# Patient Record
Sex: MALE | Race: BLACK OR AFRICAN AMERICAN
[De-identification: names, ages, dates, MRNs, and addresses within clinical notes are randomized per-mention and may not be internally consistent; named-entity substitution may affect disease eponyms.]

---

## 2017-01-04 ENCOUNTER — HOSPITAL ENCOUNTER (EMERGENCY)
Dept: HOSPITAL 25 - ED | Age: 37
Discharge: HOME | End: 2017-01-04
Payer: MEDICAID

## 2017-01-04 VITALS — DIASTOLIC BLOOD PRESSURE: 84 MMHG | SYSTOLIC BLOOD PRESSURE: 134 MMHG

## 2017-01-04 DIAGNOSIS — L03.116: Primary | ICD-10-CM
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
## 2017-01-04 DIAGNOSIS — S81.802D: ICD-10-CM

## 2017-01-04 DIAGNOSIS — I82.442: ICD-10-CM

## 2017-01-04 LAB
ALBUMIN SERPL BCG-MCNC: 4.1 G/DL (ref 3.2–5.2)
ALP SERPL-CCNC: 52 U/L (ref 34–104)
ALT SERPL W P-5'-P-CCNC: 29 U/L (ref 7–52)
ANION GAP SERPL CALC-SCNC: 6 MMOL/L (ref 2–11)
AST SERPL-CCNC: 26 U/L (ref 13–39)
BUN SERPL-MCNC: 10 MG/DL (ref 6–24)
BUN/CREAT SERPL: 10.8 (ref 8–20)
CALCIUM SERPL-MCNC: 9.5 MG/DL (ref 8.6–10.3)
CHLORIDE SERPL-SCNC: 104 MMOL/L (ref 101–111)
GLOBULIN SER CALC-MCNC: 2.9 G/DL (ref 2–4)
GLUCOSE SERPL-MCNC: 106 MG/DL (ref 70–100)
HCO3 SERPL-SCNC: 28 MMOL/L (ref 22–32)
HCT VFR BLD AUTO: 41 % (ref 42–52)
HGB BLD-MCNC: 13.8 G/DL (ref 14–18)
MCH RBC QN AUTO: 31 PG (ref 27–31)
MCHC RBC AUTO-ENTMCNC: 34 G/DL (ref 31–36)
MCV RBC AUTO: 93 FL (ref 80–94)
POTASSIUM SERPL-SCNC: 4 MMOL/L (ref 3.5–5)
PROT SERPL-MCNC: 7 G/DL (ref 6.4–8.9)
RBC # BLD AUTO: 4.38 10^6/UL (ref 4–5.4)
SODIUM SERPL-SCNC: 138 MMOL/L (ref 133–145)
WBC # BLD AUTO: 6.8 10^3/UL (ref 3.5–10.8)

## 2017-01-04 PROCEDURE — 99282 EMERGENCY DEPT VISIT SF MDM: CPT

## 2017-01-04 PROCEDURE — 80053 COMPREHEN METABOLIC PANEL: CPT

## 2017-01-04 PROCEDURE — 83605 ASSAY OF LACTIC ACID: CPT

## 2017-01-04 PROCEDURE — 86141 C-REACTIVE PROTEIN HS: CPT

## 2017-01-04 PROCEDURE — 85025 COMPLETE CBC W/AUTO DIFF WBC: CPT

## 2017-01-04 PROCEDURE — 36415 COLL VENOUS BLD VENIPUNCTURE: CPT
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
## 2017-01-05 ENCOUNTER — HOSPITAL ENCOUNTER (EMERGENCY)
Dept: HOSPITAL 25 - ED | Age: 37
Discharge: HOME | End: 2017-01-05
Payer: MEDICAID

## 2017-01-05 VITALS — SYSTOLIC BLOOD PRESSURE: 127 MMHG | DIASTOLIC BLOOD PRESSURE: 67 MMHG

## 2017-01-05 DIAGNOSIS — I82.402: ICD-10-CM

## 2017-01-05 DIAGNOSIS — L03.116: Primary | ICD-10-CM

## 2017-01-05 PROCEDURE — 99282 EMERGENCY DEPT VISIT SF MDM: CPT
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
## 2017-01-28 ENCOUNTER — HOSPITAL ENCOUNTER (EMERGENCY)
Dept: HOSPITAL 25 - ED | Age: 37
Discharge: HOME | End: 2017-01-28
Payer: MEDICAID

## 2017-01-28 VITALS — DIASTOLIC BLOOD PRESSURE: 65 MMHG | SYSTOLIC BLOOD PRESSURE: 112 MMHG

## 2017-01-28 DIAGNOSIS — Z76.0: Primary | ICD-10-CM

## 2017-01-28 PROCEDURE — 99281 EMR DPT VST MAYX REQ PHY/QHP: CPT

## 2017-01-28 NOTE — UC
UC General HPI





- History of Current Complaint


Chief Complaint: EDPrescriptionNeeded


Stated Complaint: RX REFILL


Time Seen by Provider: 01/28/17 11:16


Hx Obtained From: Patient - did not refill Eliquis in time and needs 5mg bid 

until Monday. pt has no complaints, no leg pain or CP/SOB


Pain Intensity: 0





- Allergy/Home Medications


Allergies/Adverse Reactions: 


 Allergies











Allergy/AdvReac Type Severity Reaction Status Date / Time


 


Shellfish Allergy Allergy Mild Anaphylatic Verified 01/28/17 11:03





   Shock  


 


Aspirin [ASA] Allergy  Anaphylatic Verified 01/28/17 11:03





   Shock  














PMH/Surg Hx/FS Hx/Imm Hx


Previously Healthy: Yes


Respiratory History Of: 


   Denies: Asthma


Other History Of: Anticoagulant Therapy - DVT





- Family History


Known Family History: Positive: None


   Negative: Blood Disorder





- Social History


Occupation: Unemployed


Lives: With Family


Alcohol Use: None


Substance Use Type: None


Smoking Status (MU): Never Smoked Tobacco





Review of Systems


Constitutional: Negative


Respiratory: Negative


Cardiovascular: Negative


Neurovascular: Negative


Musculoskeletal: Negative


Neurological: Negative


Psychological: Negative


All Other Systems Reviewed And Are Negative: Yes





Physical Exam


Triage Information Reviewed: Yes


Appearance: Well-Appearing, No Pain Distress, Well-Nourished


Vital Signs: 


 Initial Vital Signs











Temp  98.0 F   01/28/17 11:03


 


Pulse  72   01/28/17 11:03


 


Resp  16   01/28/17 11:03


 


BP  112/65   01/28/17 11:03


 


Pulse Ox  99   01/28/17 11:03











Vital Signs Reviewed: Yes


Respiratory Exam: Normal


Cardiovascular Exam: Normal


Musculoskeletal Exam: Normal - no calf pain or swelling


Musculoskeletal: Positive: ROM Intact


Neurological Exam: Normal


Psychological Exam: Normal


Skin Exam: Normal





Course/Dx





- Differential Dx - Multi-Symptom


Differential Diagnoses: Other - medication refill


Provider Diagnoses: Medication refill





Discharge





- Discharge Plan


Condition: Good


Disposition: HOME


Patient Education Materials:  Apixaban (By mouth)


Additional Instructions: 


follow-up with your provider on Monday early am to refill your prescription

## 2018-02-19 ENCOUNTER — HOSPITAL ENCOUNTER (EMERGENCY)
Dept: HOSPITAL 25 - ED | Age: 38
LOS: 1 days | Discharge: HOME | End: 2018-02-20
Payer: SELF-PAY

## 2018-02-19 DIAGNOSIS — F17.210: ICD-10-CM

## 2018-02-19 DIAGNOSIS — M25.562: Primary | ICD-10-CM

## 2018-02-19 DIAGNOSIS — Z04.1: ICD-10-CM

## 2018-02-19 PROCEDURE — 99282 EMERGENCY DEPT VISIT SF MDM: CPT
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
## 2018-02-20 VITALS — SYSTOLIC BLOOD PRESSURE: 114 MMHG | DIASTOLIC BLOOD PRESSURE: 59 MMHG

## 2018-06-02 ENCOUNTER — HOSPITAL ENCOUNTER (EMERGENCY)
Dept: HOSPITAL 25 - UCEAST | Age: 38
Discharge: HOME | End: 2018-06-02
Payer: COMMERCIAL

## 2018-06-02 DIAGNOSIS — F17.210: ICD-10-CM

## 2018-06-02 DIAGNOSIS — Z88.6: ICD-10-CM

## 2018-06-02 DIAGNOSIS — J02.0: Primary | ICD-10-CM

## 2018-06-02 PROCEDURE — 99202 OFFICE O/P NEW SF 15 MIN: CPT

## 2018-06-02 PROCEDURE — 87651 STREP A DNA AMP PROBE: CPT

## 2018-06-02 PROCEDURE — G0463 HOSPITAL OUTPT CLINIC VISIT: HCPCS

## 2018-06-02 NOTE — UC
Throat Pain/Nasal Macho HPI





- HPI Summary


HPI Summary: 





Patient is a 37-year-old male presenting to the  with chief complaint of 

throat pain 2 days.  Denies any fevers, sweats, chills.  Denies any sick 

contacts.  History of strep throat many years ago, and none recently.  He took 

3 tabs of azithromycin of the past day and a half without relief.  Denies 

headache, abdominal pain or other symptoms at this time.  Symptoms are 

aggravated by nothing and alleviated with nothing.  Endorses odynophagia and 

dysphagia.  Denies any ear pain or head pain.





- History of Current Complaint


Chief Complaint: UCRespiratory


Stated Complaint: THROAT PAIN


Time Seen by Provider: 06/02/18 10:29


Hx Obtained From: Patient


Onset/Duration: Sudden Onset


Severity: Moderate


Pain Intensity: 9


Pain Scale Used: 0-10 Numeric





- Epiglottits Risk Factors


Epiglottis Risk Factors: Negative





- Allergies/Home Medications


Allergies/Adverse Reactions: 


 Allergies











Allergy/AdvReac Type Severity Reaction Status Date / Time


 


aspirin Allergy  Swelling Verified 06/02/18 10:37











Home Medications: 


 Home Medications





Azithromycin TAB* [Zithromax TAB (Z-DELMY) 250 mg #6 tabs] 250 mg PO DAILY 06/02/ 18 [History Confirmed 06/02/18]











PMH/Surg Hx/FS Hx/Imm Hx


Previously Healthy: Yes





- Surgical History


Surgical History: Yes


Surgery Procedure, Year, and Place: anthroscopic to lt shoulder





- Family History


Known Family History: Positive: Unknown





- Social History


Occupation: Employed Full-time


Lives: With Family


Alcohol Use: Occasionally


Substance Use Type: None


Smoking Status (MU): Heavy Every Day Tobacco Smoker


Type: Cigarettes





Review of Systems


Constitutional: Negative


Skin: Negative


Eyes: Negative


ENT: Sore Throat


Respiratory: Negative


Cardiovascular: Negative


Motor: Negative


Neurological: Negative


Psychological: Negative


Is Patient Immunocompromised?: No


All Other Systems Reviewed And Are Negative: Yes





Physical Exam


Triage Information Reviewed: Yes


Appearance: Well-Appearing, No Pain Distress, Well-Nourished


Vital Signs: 


 Initial Vital Signs











Temp  97.4 F   06/02/18 10:32


 


Pulse  80   06/02/18 10:32


 


Resp  18   06/02/18 10:32


 


BP  120/77   06/02/18 10:32


 


Pulse Ox  100   06/02/18 10:32











Eye Exam: Normal


Eyes: Positive: Conjunctiva Clear


ENT: Positive: Pharyngeal erythema, Tonsillar swelling, Tonsillar exudate, 

Uvula midline.  Negative: Pharynx normal, TM bulging, TM dull, TM red, Trismus, 

Muffled voice, Hoarse voice, Dental tenderness, Sinus tenderness


Neck: Positive: Supple


Respiratory Exam: Normal


Respiratory: Positive: Chest non-tender, Lungs clear


Cardiovascular Exam: Normal


Cardiovascular: Positive: RRR


Musculoskeletal Exam: Normal


Neurological: Positive: Alert, Muscle Tone Normal


Psychological: Positive: Age Appropriate Behavior


Skin Exam: Normal





Throat Pain/Nasal Course/Dx





- Course


Course Of Treatment: The patient's evaluated for throat pain.  Strep swab 

obtained and is positive.  Physical examination is otherwise benign.  Lungs 

CTA.  RRR.  There is pharyngeal erythema with bilateral tonsillar exudates.  No 

trismus, no muffled voice.  Uvula is midline.  There is no signs of 

peritonsillar abscesses bilaterally.  He is given amoxicillin 500 mg twice a 

day 10 days.  Vital signs are stable including blood pressure.





- Differential Dx/Diagnosis


Provider Diagnoses: Strep throat





Discharge





- Sign-Out/Discharge


Documenting (check all that apply): Discharge/Admit/Transfer





- Discharge Plan


Condition: Stable


Disposition: HOME


Prescriptions: 


Amoxicillin PO (*) [Amoxicillin 500 MG CAP*] 500 mg PO Q12H #20 cap


Patient Education Materials:  Strep Throat (ED)


Referrals: 


No Primary Care Phys,NOPCP [Primary Care Provider] - 


Additional Instructions: 


complete the entire course of abx even if you begin to feel better!


amoxicillin twice daily x 10 days


wash hands frequently





- Billing Disposition and Condition


Condition: STABLE


Disposition: HOME

## 2018-10-02 ENCOUNTER — HOSPITAL ENCOUNTER (EMERGENCY)
Dept: HOSPITAL 25 - ED | Age: 38
LOS: 1 days | Discharge: HOME | End: 2018-10-03
Payer: MEDICAID

## 2018-10-02 DIAGNOSIS — R07.9: Primary | ICD-10-CM

## 2018-10-02 DIAGNOSIS — Z79.01: ICD-10-CM

## 2018-10-02 DIAGNOSIS — F17.210: ICD-10-CM

## 2018-10-02 PROCEDURE — 99283 EMERGENCY DEPT VISIT LOW MDM: CPT

## 2018-10-02 PROCEDURE — 80053 COMPREHEN METABOLIC PANEL: CPT

## 2018-10-02 PROCEDURE — 81003 URINALYSIS AUTO W/O SCOPE: CPT

## 2018-10-02 PROCEDURE — 83690 ASSAY OF LIPASE: CPT

## 2018-10-02 PROCEDURE — 85025 COMPLETE CBC W/AUTO DIFF WBC: CPT

## 2018-10-02 PROCEDURE — 84443 ASSAY THYROID STIM HORMONE: CPT

## 2018-10-02 PROCEDURE — 84484 ASSAY OF TROPONIN QUANT: CPT

## 2018-10-02 PROCEDURE — 86140 C-REACTIVE PROTEIN: CPT

## 2018-10-02 PROCEDURE — 71045 X-RAY EXAM CHEST 1 VIEW: CPT

## 2018-10-02 PROCEDURE — 93005 ELECTROCARDIOGRAM TRACING: CPT

## 2018-10-02 PROCEDURE — 36415 COLL VENOUS BLD VENIPUNCTURE: CPT

## 2018-10-03 VITALS — SYSTOLIC BLOOD PRESSURE: 109 MMHG | DIASTOLIC BLOOD PRESSURE: 62 MMHG

## 2018-10-03 LAB
BASOPHILS # BLD AUTO: 0 10^3/UL (ref 0–0.2)
EOSINOPHIL # BLD AUTO: 0.1 10^3/UL (ref 0–0.6)
HCT VFR BLD AUTO: 41 % (ref 42–52)
HGB BLD-MCNC: 13.9 G/DL (ref 14–18)
LYMPHOCYTES # BLD AUTO: 2.5 10^3/UL (ref 1–4.8)
MCH RBC QN AUTO: 32 PG (ref 27–31)
MCHC RBC AUTO-ENTMCNC: 34 G/DL (ref 31–36)
MCV RBC AUTO: 94 FL (ref 80–94)
MONOCYTES # BLD AUTO: 0.5 10^3/UL (ref 0–0.8)
NEUTROPHILS # BLD AUTO: 4.5 10^3/UL (ref 1.5–7.7)
NRBC # BLD AUTO: 0 10^3/UL
NRBC BLD QL AUTO: 0.3
PLATELET # BLD AUTO: 264 10^3/UL (ref 150–450)
RBC # BLD AUTO: 4.32 10^6/UL (ref 4–5.4)
WBC # BLD AUTO: 7.6 10^3/UL (ref 3.5–10.8)

## 2018-10-03 NOTE — ED
Course/Dx





- Course


Course Of Treatment: Patient complains of sudden onset left chest pain 1 hour.

  Chest pain described as new onset, occurring at rest, sharp, throbbing, 

lasting around 40 minutes, no radiation.  States chest pain resolved around 

time of triage.  Denies prior cardiac history.  Denies fever, cough, sore 

throat somewhat, SOB, N/V/D, abdominal pain, change in urine, change in BM.  

Medical history is none.  Denies family cardiac history.  Positive smoker.  

Denies excess caffeine, monster drinks, methamphetamine, cocaine.  Admits to 

occasional EtOH.  Physical exam:Chest pain not reproducible with palpation or 

movement of arms.  Vital signs within normal limits.  EKG unremarkable.  Chest x

-ray negative.  Lab work and initial troponin negative.  Smoking is only risk 

factor.  We'll sign patient out to Dr Tyson to wait for second troponin





- Diagnoses


Provider Diagnoses: 


 Chest pain








Discharge





- Sign-Out/Discharge


Documenting (check all that apply): Patient Departure - discharge, Receiving 

Sign-Out


Receiving patient FROM: Ryan Dahl





- Discharge Plan


Condition: Stable


Disposition: HOME


Patient Education Materials:  Chest Pain (ED)


Referrals: 


Angel Mcgrath MD [Primary Care Provider] - 


Donnie Mittal MD [Medical Doctor] - 


Additional Instructions: 


Return to the emergency department for any new or worsening symptoms.


Follow up with your primary care physician in 1-2 days, and the provided 

cardiologist (or your own) for an outpatient stress test as soon as possible.





- Attestation Statements


Document Initiated by Scribe: Yes


Documenting Scribe: Jorden Ríos


Provider For Whom Dominicibpietro is Documenting (Include Credential): Dr. Felisa Tyson MD


Scribe Attestation: 


Jorden PAK, marced for Dr. Felisa Tyson MD on 10/03/18 at 0347.

## 2018-10-03 NOTE — ED
HPI Chest Pain





- HPI Summary


HPI Summary: 





Patient complains of sudden onset left chest pain 1 hour.  Chest pain 

described as new onset, occurring at rest, sharp, throbbing, lasting around 40 

minutes, no radiation.  States chest pain resolved around time of triage.  

Denies prior cardiac history.  Denies fever, cough, sore throat somewhat, SOB, N

/V/D, abdominal pain, change in urine, change in BM.  Medical history is none.  

Denies family cardiac history.  Positive smoker.  Denies excess caffeine, 

monster drinks, methamphetamine, cocaine.  Admits to occasional EtOH.





- History of Current Complaint


Chief Complaint: EDChestPainROMI


Time Seen by Provider: 10/03/18 00:13


Hx Obtained From: Patient


Onset/Duration: Started Hours Ago


Timing: Constant


Initial Severity: Moderate


Current Severity: None


Pain Intensity: 5


Pain Scale Used: 0-10 Numeric


Chest Pain Location: Left Anterior


Chest Pain Radiates: No


Character: Sharp/Stabbing


Aggravating Factor(s): Nothing


Alleviating Factor(s): Nothing


Associated Signs and Symptoms: Positive: Chest Pain





- Risk Factors


Pulmonary Embolism Risk Factors: Smoking





- Allergy/Home Medications


Allergies/Adverse Reactions: 


 Allergies











Allergy/AdvReac Type Severity Reaction Status Date / Time


 


aspirin Allergy  Anaphylatic Verified 10/02/18 23:42





   Shock  











Home Medications: 


 Home Medications





NK [No Home Medications Reported]  10/03/18 [History Confirmed 10/03/18]











PMH/Surg Hx/FS Hx/Imm Hx


Endocrine/Hematology History: Reports: Hx Anticoagulant Therapy - DVT


   Denies: Hx Diabetes


Cardiovascular History: 


   Denies: Hx Cardiac Arrest, Hx Hypertension, Hx Pacemaker/ICD


Respiratory History: 


   Denies: Hx Asthma


 History: 


   Denies: Hx Dialysis, Hx Renal Disease


Sensory History: 


   Denies: Hx Hearing Aid


Neurological History: 


   Denies: Hx CVA


Psychiatric History: 


   Denies: Hx Panic Disorder





- Surgical History


Surgery Procedure, Year, and Place: LEFT SHOULDER ENDOSCOPIC


Infectious Disease History: No


Infectious Disease History: 


   Denies: Traveled Outside the [place] in Last 30 Days





- Family History


Known Family History: Positive: None, Unknown


   Negative: Blood Disorder





- Social History


Alcohol Use: Weekly


Hx Substance Use: No


Substance Use Type: Reports: None


Hx Tobacco Use: Yes


Smoking Status (MU): Heavy Every Day Tobacco Smoker


Type: Cigarettes





Review of Systems


Constitutional: Negative


Eyes: Negative


ENT: Negative


Positive: Chest Pain


Respiratory: Negative


Gastrointestinal: Negative


Genitourinary: Negative


Musculoskeletal: Negative


Skin: Negative


Neurological: Negative


Psychological: Normal


All Other Systems Reviewed And Are Negative: Yes





Physical Exam





- Summary


Physical Exam Summary: 





Chest pain not reproducible with palpation or movement of arms.


Triage Information Reviewed: Yes


Vital Signs On Initial Exam: 


 Initial Vitals











Temp Pulse Resp BP Pulse Ox


 


 98.0 F   66   16   106/56   99 


 


 10/02/18 23:40  10/02/18 23:40  10/02/18 23:40  10/02/18 23:40  10/02/18 23:40











Vital Signs Reviewed: Yes


Appearance: Positive: Well-Appearing


Skin: Positive: Warm


Head/Face: Positive: Normal Head/Face Inspection


Eyes: Positive: Normal


Neck: Positive: Supple


Respiratory/Lung Sounds: Positive: Clear to Auscultation


Cardiovascular: Positive: Normal


Abdomen Description: Positive: Nontender


Musculoskeletal: Positive: Normal


Neurological: Positive: Normal


Psychiatric: Positive: Normal


AVPU Assessment: Alert





- [place] Coma Scale


Best Eye Response: 4 - Spontaneous


Best Motor Response: 6 - Obeys Commands


Best Verbal Response: 5 - Oriented


Coma Scale Total: 15





Diagnostics





- Vital Signs


 Vital Signs











  Temp Pulse Resp BP Pulse Ox


 


 10/02/18 23:40  98.0 F  66  16  106/56  99














- Laboratory


Result Diagrams: 


 10/03/18 00:25





 10/03/18 00:25


Lab Statement: Any lab studies that have been ordered have been reviewed, and 

results considered in the medical decision making process.





- Radiology


  ** cxr


Xray Interpretation: No Acute Changes


Radiology Interpretation Completed By: ED Physician





- EKG


  ** 1


Cardiac Rate: NL


EKG Rhythm: Sinus Rhythm


ST Segment: Non-Specific


Ectopy: None





Chest Pain Course/Dx





- Course


Course Of Treatment: Patient complains of sudden onset left chest pain 1 hour.

  Chest pain described as new onset, occurring at rest, sharp, throbbing, 

lasting around 40 minutes, no radiation.  States chest pain resolved around 

time of triage.  Denies prior cardiac history.  Denies fever, cough, sore 

throat somewhat, SOB, N/V/D, abdominal pain, change in urine, change in BM.  

Medical history is none.  Denies family cardiac history.  Positive smoker.  

Denies excess caffeine, monster drinks, methamphetamine, cocaine.  Admits to 

occasional EtOH.  Physical exam:Chest pain not reproducible with palpation or 

movement of arms.  Vital signs within normal limits.  EKG unremarkable.  Chest x

-ray negative.  Lab work and initial troponin negative.  Smoking is only risk 

factor.  We'll sign patient out to Dr Tyson to wait for second troponin





- Diagnoses


Provider Diagnoses: 


 Chest pain








Discharge





- Sign-Out/Discharge


Documenting (check all that apply): Sign-Out Patient


Signing out patient TO: Felisa Tyson





- Discharge Plan


Condition: Stable


Disposition: HOME


Patient Education Materials:  Chest Pain (ED)


Referrals: 


Donnie Mittal MD [Medical Doctor] - 


Angel Mcgrath MD [Primary Care Provider] - 


Additional Instructions: 


Return to the emergency department for any new or worsening symptoms.


Follow up with your primary care physician in 1-2 days, and the provided 

cardiologist (or your own) for an outpatient stress test as soon as possible.





- Billing Disposition and Condition


Condition: STABLE


Disposition: Home

## 2018-10-03 NOTE — RAD
HISTORY: cp



COMPARISONS: None



VIEWS: 1: frontal AP view of the chest at 12:47 AM 



FINDINGS:

LINES AND TUBES: None.

CARDIOMEDIASTINAL SILHOUETTE: The cardiomediastinal silhouette is normal for portable

technique.

PLEURA: The costophrenic angles are sharp. No pleural abnormalities are noted.

LUNG PARENCHYMA: The lungs are clear.

ABDOMEN: The upper abdomen is clear. There is no subphrenic gas.

BONES AND SOFT TISSUES: No bone or soft tissue abnormalities are noted.



IMPRESSION: NO ACTIVE CARDIOPULMONARY DISEASE.



R0

## 2018-11-29 ENCOUNTER — HOSPITAL ENCOUNTER (EMERGENCY)
Dept: HOSPITAL 25 - UCEAST | Age: 38
Discharge: HOME | End: 2018-11-29
Payer: COMMERCIAL

## 2018-11-29 VITALS — DIASTOLIC BLOOD PRESSURE: 80 MMHG | SYSTOLIC BLOOD PRESSURE: 117 MMHG

## 2018-11-29 DIAGNOSIS — J02.9: Primary | ICD-10-CM

## 2018-11-29 DIAGNOSIS — H53.8: ICD-10-CM

## 2018-11-29 DIAGNOSIS — F17.210: ICD-10-CM

## 2018-11-29 DIAGNOSIS — Z86.718: ICD-10-CM

## 2018-11-29 DIAGNOSIS — N34.2: ICD-10-CM

## 2018-11-29 DIAGNOSIS — Z88.6: ICD-10-CM

## 2018-11-29 DIAGNOSIS — Z79.01: ICD-10-CM

## 2018-11-29 PROCEDURE — 99212 OFFICE O/P EST SF 10 MIN: CPT

## 2018-11-29 PROCEDURE — 87491 CHLMYD TRACH DNA AMP PROBE: CPT

## 2018-11-29 PROCEDURE — G0463 HOSPITAL OUTPT CLINIC VISIT: HCPCS

## 2018-11-29 PROCEDURE — 87651 STREP A DNA AMP PROBE: CPT

## 2018-11-29 PROCEDURE — 87591 N.GONORRHOEAE DNA AMP PROB: CPT

## 2018-11-29 PROCEDURE — 96372 THER/PROPH/DIAG INJ SC/IM: CPT

## 2018-11-29 PROCEDURE — 81003 URINALYSIS AUTO W/O SCOPE: CPT

## 2018-11-29 NOTE — UC
Throat Pain/Nasal Abimael HPI





- HPI Summary


HPI Summary: 





38-year-old man complains of 3 days of nasal congestion followed by worsening 

sore throat.  He has tenderness in his neck as well.  He has had subjective 

fever without measuring a temperature.  He denies any nausea vomiting or 

diarrhea.  He had no cough.  He does report about 10 day history of blurred 

vision in his right eye only.  This is experienced only when outdoors.  He 

states his vision indoors is normal.  He has no eye pain, redness and denies 

any injury to it.  He does not wear contacts or glasses.





- History of Current Complaint


Chief Complaint: UCRespiratory


Stated Complaint: SORE THROAT


Time Seen by Provider: 11/29/18 10:59


Hx Obtained From: Patient


Pain Intensity: 7





- Allergies/Home Medications


Allergies/Adverse Reactions: 


 Allergies











Allergy/AdvReac Type Severity Reaction Status Date / Time


 


aspirin Allergy  Anaphylatic Verified 11/29/18 10:49





   Shock  














PMH/Surg Hx/FS Hx/Imm Hx


Previously Healthy: Yes


Other History Of: Anticoagulant Therapy - DVT





- Surgical History


Surgical History: Yes


Surgery Procedure, Year, and Place: LEFT SHOULDER ENDOSCOPIC,umb hernia





- Family History


Known Family History: Positive: None, Unknown - Patient states he is unaware


   Negative: Blood Disorder





- Social History


Occupation: Employed Full-time


Alcohol Use: Weekly


Substance Use Type: None


Smoking Status (MU): Heavy Every Day Tobacco Smoker


Type: Cigarettes





Review of Systems


All Other Systems Reviewed And Are Negative: Yes


Constitutional: Positive: Chills


Skin: Positive: Negative


Eyes: Positive: Blurred Vision.  Negative: Diplopia, Drainage, Eye Redness, 

Photophobia


ENT: Positive: Sore Throat, Nasal Discharge, Sinus Congestion.  Negative: Ear 

Ache, Sinus Pain/Tenderness


Respiratory: Positive: Negative


Cardiovascular: Positive: Negative


Neurological: Negative: Headache, Weakness





Physical Exam


Triage Information Reviewed: Yes


Appearance: Well-Appearing, No Pain Distress


Vital Signs: 


 Initial Vital Signs











Temp  99 F   11/29/18 10:46


 


Pulse  93   11/29/18 10:46


 


Resp  16   11/29/18 10:46


 


BP  117/80   11/29/18 10:46


 


Pulse Ox  99   11/29/18 10:46











Eyes: Positive: Conjunctiva Clear, Other: - Pupils are equal and reactive


ENT: Positive: Pharyngeal erythema, Nasal congestion, Nasal drainage, TMs normal

, Tonsillar swelling, Tonsillar exudate.  Negative: Trismus, Muffled voice, 

Hoarse voice, Sinus tenderness


Neck: Positive: Enlarged Nodes @ - Bilateral anterior cervical


Respiratory Exam: Normal


Cardiovascular Exam: Normal


Musculoskeletal: Positive: Strength Intact, ROM Intact


Neurological: Positive: Alert


Skin Exam: Normal





Re-Evaluation





- Re-Evaluation


  ** First Eval


Re-Evaluation Time: 11:51


Comment: Patient strep test is negative however how he adds that he's had some 

mild dysuria and is concerned for possibility of sexually transmitted 

infection.  He has been sexually active and unprotected.  He has had symptoms 

for approximately 2 weeks.  He denies any rash or lesion and any discharge.





Throat Pain/Nasal Course/Dx





- Course


Course Of Treatment: Minor URI symptoms with pharyngitis.  This may have been a 

front for his 4 seizures complaint which is concerning for STI.  Testing was 

performed and he was given prophylaxis with Rocephin and Zithromax.  He has no 

signs of uveitis or arthritis.  There is no rash.  He will also be given a 

short course of steroids and a decongestant.  Visual acuity in the affected 

right eye is 20/40 as compared to normal 20/20 in the left.  He will be 

referred to optometry/ophthalmology just across the street





- Differential Dx/Diagnosis


Differential Diagnosis/HQI/PQRI: Tonsillitis, URI, Other - STI


Provider Diagnosis: 


 Acute pharyngitis, Urethritis, Blurred vision, right eye








Discharge





- Sign-Out/Discharge


Documenting (check all that apply): Patient Departure


All imaging exams completed and their final reports reviewed: No Studies





- Discharge Plan


Condition: Improved


Disposition: HOME


Patient Education Materials:  Blurred Vision (ED), Sexually Transmitted 

Diseases (ED), Pharyngitis (ED)


Forms:  *Work Release


Referrals: 


Angel Mcgrath MD [Primary Care Provider] - 


Ignacio Tipton MD [Medical Doctor] - 


Additional Instructions: 


Call the ophthalmologist today to schedule follow-up for your blurred vision.  

Call your primary care doctor to schedule follow-up for your illness.  Avoid 

sexual intercourse.  If you test positive we will call you with a positive 

result.  You should have your partner treated if you test positive.  Return 

with fever, increased visual changes, eye pain, arthritis, worse or other 

concerns as discussed.





- Billing Disposition and Condition


Condition: IMPROVED


Disposition: Home

## 2018-11-30 NOTE — UC
- Progress Note


Progress Note: 





11/30/2018


 GC/chlamydia negative.


Pt given prophylactic treatment of Rocephin and Azithromycin\.


No change


Radha Rey PA-C





Re-Evaluation





- Re-Evaluation


  ** First Eval


Re-Evaluation Time: 11:51


Comment: Patient strep test is negative however how he adds that he's had some 

mild dysuria and is concerned for possibility of sexually transmitted 

infection.  He has been sexually active and unprotected.  He has had symptoms 

for approximately 2 weeks.  He denies any rash or lesion and any discharge.





Course/Dx





- Diagnoses


Provider Diagnoses: 


 Acute pharyngitis, Urethritis, Blurred vision, right eye








Discharge





- Sign-Out/Discharge


Documenting (check all that apply): Patient Departure - D/c home


All imaging exams completed and their final reports reviewed: No Studies





- Discharge Plan


Condition: Improved


Disposition: HOME


Patient Education Materials:  Sexually Transmitted Diseases (ED), Pharyngitis (

ED), Blurred Vision (ED)


Forms:  *Work Release


Referrals: 


Ignacio Tipton MD [Medical Doctor] - 


Angel Mcgrath MD [Primary Care Provider] - 


Additional Instructions: 


Call the ophthalmologist today to schedule follow-up for your blurred vision.  

Call your primary care doctor to schedule follow-up for your illness.  Avoid 

sexual intercourse.  If you test positive we will call you with a positive 

result.  You should have your partner treated if you test positive.  Return 

with fever, increased visual changes, eye pain, arthritis, worse or other 

concerns as discussed.





- Billing Disposition and Condition


Condition: IMPROVED


Disposition: Home

## 2019-06-21 ENCOUNTER — HOSPITAL ENCOUNTER (EMERGENCY)
Dept: HOSPITAL 25 - UCEAST | Age: 39
Discharge: HOME | End: 2019-06-21
Payer: SELF-PAY

## 2019-06-21 VITALS — SYSTOLIC BLOOD PRESSURE: 111 MMHG | DIASTOLIC BLOOD PRESSURE: 70 MMHG

## 2019-06-21 DIAGNOSIS — Z86.718: ICD-10-CM

## 2019-06-21 DIAGNOSIS — Z79.01: ICD-10-CM

## 2019-06-21 DIAGNOSIS — F17.210: ICD-10-CM

## 2019-06-21 DIAGNOSIS — R36.9: Primary | ICD-10-CM

## 2019-06-21 PROCEDURE — 84702 CHORIONIC GONADOTROPIN TEST: CPT

## 2019-06-21 PROCEDURE — 81003 URINALYSIS AUTO W/O SCOPE: CPT

## 2019-06-21 PROCEDURE — 87491 CHLMYD TRACH DNA AMP PROBE: CPT

## 2019-06-21 PROCEDURE — 96372 THER/PROPH/DIAG INJ SC/IM: CPT

## 2019-06-21 PROCEDURE — G0463 HOSPITAL OUTPT CLINIC VISIT: HCPCS

## 2019-06-21 PROCEDURE — 99212 OFFICE O/P EST SF 10 MIN: CPT

## 2019-06-21 PROCEDURE — 87661 TRICHOMONAS VAGINALIS AMPLIF: CPT

## 2019-06-21 PROCEDURE — 87591 N.GONORRHOEAE DNA AMP PROB: CPT
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
## 2019-06-21 NOTE — UC
Complaint Male HPI





- HPI Summary


HPI Summary: 





Patient presents to urgent care with 3 days progressive penile discharge.  

Patient states it's clear to white color.  Patient with dysuria and frequency.  

Patient denies any back pain.  No testicular pain.  Patient with a remote 

history of STD.  Patient states his partner does not have any symptoms.  

Patient has had intercourse without using condoms.  Patient without any 

external lesions.  Medications reviewed this visit.





- History of Current Complaint


Chief Complaint: UCGU


Stated Complaint: PERSONAL


Time Seen by Provider: 06/21/19 16:11


Hx Obtained From: Patient


Severity Initially: Moderate


Severity Currently: Moderate


Pain Intensity: 7


Pain Scale Used: 0-10 Numeric





- Allergies/Home Medications


Allergies/Adverse Reactions: 


 Allergies











Allergy/AdvReac Type Severity Reaction Status Date / Time


 


aspirin Allergy  Anaphylatic Verified 06/21/19 14:46





   Shock  














PMH/Surg Hx/FS Hx/Imm Hx


Previously Healthy: Yes


Other History Of: Anticoagulant Therapy - DVT





- Surgical History


Surgical History: Yes


Surgery Procedure, Year, and Place: LEFT SHOULDER ENDOSCOPIC,umb hernia





- Family History


Known Family History: Positive: None, Unknown - Patient states he is unaware, 

Non-Contributory


   Negative: Blood Disorder





- Social History


Occupation: Employed Full-time


Lives: With Family


Alcohol Use: Weekly


Substance Use Type: None


Smoking Status (MU): Heavy Every Day Tobacco Smoker


Type: Cigarettes





Review of Systems


All Other Systems Reviewed And Are Negative: Yes


Constitutional: Positive: Negative


Genitourinary: Positive: Vaginal/Penile Burning, Vaginal/Penile Discharge.  

Negative: Ulceration/Lesion





Physical Exam





- Summary


Physical Exam Summary: 





Vital Signs Reviewed: Yes


A+Ox3, no distress


Eyes: Conjunctiva Clear


ENT: Hearing grossly normal  


neck: supple


Respiratory: Positive: No respiratory distress, No accessory muscle use,CTA 

throughotu no w/r


Cardiovascular: skin color reflect adequate perfusion RRR nl s1 s2 no m/r


abd soft + BS n oguarding, no rebound 


genetaila exam with RN chaparone  + white, scan discharge no external lesions 

no epidydmal pain  no lesions no hernia


Musculoskeletal Exam: KOVACS x 4 without difficulty 


Neurological: Positive: Alert,  ambulatory without difficulty


Psychological: Positive: Normal Response To Family


Skin: Positive: no rash, no ecchymosis


Triage Information Reviewed: Yes


Vital Signs: 


 Initial Vital Signs











Temp  99 F   06/21/19 14:41


 


Pulse  66   06/21/19 14:41


 


Resp  12   06/21/19 14:41


 


BP  111/70   06/21/19 14:41


 


Pulse Ox  100   06/21/19 14:41














 Complaint Male Course/Dx





- Course


Course Of Treatment: 





Patient presents to urgent care reporting penile discharge for 2-3 days.  Also 

with some dysuria.  Urine does not show infection.  Discussed with patient 

regarding STD eyes.  We'll treat with ceftriaxone and Zithromax today.  We'll 

culture his urine for GC, chlamydia, and trichomonas.  Recommended patient use 

condoms or abstain.  Strict return precautions.  Patient comfortable in 

agreement with plan.











Of note, a pregnancy test was inadvertently ordered on this patient.  Patient 

was notified.  Test was canceled. Pt states understanding, will address with 

med records and billing .





- Differential Dx/Diagnosis


Provider Diagnosis: 


 Penile discharge








Discharge





- Sign-Out/Discharge


Documenting (check all that apply): Patient Departure


All imaging exams completed and their final reports reviewed: No Studies





- Discharge Plan


Condition: Stable


Disposition: HOME


Patient Education Materials:  Sexually Transmitted Diseases (ED), Condom Use (ED

)


Forms:  *Work Release


Referrals: 


Angel Mcgrath MD [Primary Care Provider] - 


Additional Instructions: 


As discussed you have been treated for both gonorrhea and chlamydia today. Your 

urine has been tested for these 2 infections as well as trichamonas.  These 

tests may take up to 1 week to come back. It is recommended you use a condom 

for at least 2 weeks.   


Stay well hydrated. Drink plenty of non-alcoholic, non-caffinated beverages


IF you have increased pain, fever, difficulty urinating or any other concerns 

you should go to the emergency department for further testing and evaluation





- Billing Disposition and Condition


Condition: STABLE


Disposition: Home

## 2019-06-25 LAB — SPECIMEN SOURCE: (no result)

## 2020-01-14 ENCOUNTER — HOSPITAL ENCOUNTER (EMERGENCY)
Dept: HOSPITAL 25 - UCEAST | Age: 40
Discharge: HOME | End: 2020-01-14
Payer: COMMERCIAL

## 2020-01-14 VITALS — SYSTOLIC BLOOD PRESSURE: 123 MMHG | DIASTOLIC BLOOD PRESSURE: 67 MMHG

## 2020-01-14 DIAGNOSIS — F17.210: ICD-10-CM

## 2020-01-14 DIAGNOSIS — Z79.01: ICD-10-CM

## 2020-01-14 DIAGNOSIS — Z86.718: ICD-10-CM

## 2020-01-14 DIAGNOSIS — Z88.6: ICD-10-CM

## 2020-01-14 DIAGNOSIS — H00.025: Primary | ICD-10-CM

## 2020-01-14 PROCEDURE — 99212 OFFICE O/P EST SF 10 MIN: CPT

## 2020-01-14 PROCEDURE — G0463 HOSPITAL OUTPT CLINIC VISIT: HCPCS

## 2020-01-14 NOTE — UC
Eye Complaint HPI





- HPI Summary


HPI Summary: 


Patient is a 38yo male presenting with c/o R eye pain x3 days. Patient notes 

blurry vision when going from light to dark but that that has been happening 

for 6 months now. States he was seen by Dr. Tipton for that who recommended 

saline drops.  Denies any new vision changes.  Denies contact lens use.  Notes 

crusting this morning and swelling of lower eyelid.  States both of those have 

resolved.  Notes that the pain is localized in right lower part of the front 

side.  Denies itching.  States it feels like something is stuck in his eye.  

Denies injury or trauma tight that he knows of.  Denies drinking anything for 

pain relief.  Denies seasonal allergies.








- History of Current Complaint


Chief Complaint: UCEye


Stated Complaint: EYE COMPLAINT


Hx Obtained From: Patient


Pain Intensity: 5


Pain Scale Used: 0-10 Numeric





- Allergies/Home Medications


Allergies/Adverse Reactions: 


 Allergies











Allergy/AdvReac Type Severity Reaction Status Date / Time


 


aspirin Allergy  Anaphylatic Verified 01/14/20 13:59





   Shock  














PMH/Surg Hx/FS Hx/Imm Hx


Other History Of: Anticoagulant Therapy - DVT





- Surgical History


Surgical History: Yes


Surgery Procedure, Year, and Place: LEFT SHOULDER ENDOSCOPIC,umb hernia





- Family History


Known Family History: Positive: None, Unknown - Patient states he is unaware, 

Non-Contributory


   Negative: Blood Disorder





- Social History


Alcohol Use: Occasionally


Substance Use Type: None


Smoking Status (MU): Heavy Every Day Tobacco Smoker


Type: Cigarettes





Review of Systems


All Other Systems Reviewed And Are Negative: Yes


Constitutional: Positive: Negative.  Negative: Fever, Chills


Eyes: Positive: Blurred Vision - x6 months, Drainage - yellow crusting of eye 

this morning, Other - swelling of lower eyelid this morning.  Negative: Diplopia

, Eye Redness, Photophobia


ENT: Positive: Negative


Respiratory: Positive: Negative


Cardiovascular: Positive: Negative


Musculoskeletal: Positive: Negative


Neurological: Positive: Negative.  Negative: Headache





Physical Exam


Triage Information Reviewed: Yes


Appearance: Well-Appearing, No Pain Distress, Well-Nourished


Vital Signs: 


 Initial Vital Signs











Temp  99.4 F   01/14/20 13:55


 


Pulse  69   01/14/20 13:55


 


Resp  16   01/14/20 13:55


 


BP  123/67   01/14/20 13:55


 


Pulse Ox  98   01/14/20 13:55











Vital Signs Reviewed: Yes


Eye Exam: Other - PERRLA. EOM intact


Eyes: Positive: Conjunctiva Clear, Other: - small stye noted on internal R 

lower eyelid. minimal yellow drainage from stye. no surround edema or erythema 

appreciated


ENT Exam: Normal


ENT: Positive: Hearing grossly normal, Pharynx normal, TMs normal


Neck exam: Normal


Neck: Positive: Supple, Nontender, No Lymphadenopathy


Respiratory Exam: Normal


Respiratory: Positive: Lungs clear, Normal breath sounds, No respiratory 

distress


Cardiovascular Exam: Normal


Cardiovascular: Positive: RRR


Psychological: Positive: Age Appropriate Behavior


Skin Exam: Normal - no erythema or ecchymosis noted





Eye Complaint Course/Dx





- Course


Course Of Treatment: 


Visual acuity within normal limits.  Educated on size and instructed to apply 

warm compresses and take OTC analgesics for symptomatic relief of stye.  

Instructed to follow up with PCP or ophthalmologist if symptoms do not resolve 

within 1-2 weeks.  Instructed to follow-up with ophthalmologist for further 

evaluation of chronic blurry vision.  Patient voiced understanding and agreed 

with the treatment plan.








- Differential Dx/Diagnosis


Provider Diagnosis: 


 Hordeolum internum left lower eyelid








Discharge ED





- Sign-Out/Discharge


Documenting (check all that apply): Patient Departure


All imaging exams completed and their final reports reviewed: No Studies





- Discharge Plan


Condition: Stable


Disposition: HOME


Patient Education Materials:  Stye (ED)


Referrals: 


Angel Mcgrath MD [Primary Care Provider] - If Needed


Additional Instructions: 


As discussed, you have a stye in your right eye.


Apply warm compresses to the eye 2-3 times daily. 


You may take ibuprofen or tylenol for pain relief.


Follow up with your primary care provider if symptoms do not resolve within 1-2 

weeks.


Return or go to the emergency room if you experience severe eye pain, 

increasing redness and warmth around the eye, or new vision changes.





- Billing Disposition and Condition


Condition: STABLE


Disposition: Home





- Attestation Statements


Provider Attestation: 





I was available for consult. This patient was seen by the RICARDO. The patient was 

not presented to, seen by, or examined by me. -John